# Patient Record
Sex: MALE | RURAL
[De-identification: names, ages, dates, MRNs, and addresses within clinical notes are randomized per-mention and may not be internally consistent; named-entity substitution may affect disease eponyms.]

---

## 2018-08-31 ENCOUNTER — APPOINTMENT (RX ONLY)
Dept: RURAL CLINIC 1 | Facility: CLINIC | Age: 28
Setting detail: DERMATOLOGY
End: 2018-08-31

## 2018-08-31 VITALS — HEIGHT: 72 IN | WEIGHT: 220 LBS

## 2018-08-31 DIAGNOSIS — L71.8 OTHER ROSACEA: ICD-10-CM

## 2018-08-31 PROCEDURE — 99202 OFFICE O/P NEW SF 15 MIN: CPT

## 2018-08-31 PROCEDURE — ? COUNSELING

## 2018-08-31 PROCEDURE — ? PRESCRIPTION

## 2018-08-31 RX ORDER — MINOCYCLINE HYDROCHLORIDE 50 MG/1
TABLET ORAL
Qty: 60 | Refills: 3 | COMMUNITY
Start: 2018-08-31

## 2018-08-31 RX ADMIN — MINOCYCLINE HYDROCHLORIDE: 50 TABLET ORAL at 11:48

## 2018-08-31 ASSESSMENT — LOCATION DETAILED DESCRIPTION DERM: LOCATION DETAILED: RIGHT MEDIAL MALAR CHEEK

## 2018-08-31 ASSESSMENT — LOCATION ZONE DERM: LOCATION ZONE: FACE

## 2018-08-31 ASSESSMENT — LOCATION SIMPLE DESCRIPTION DERM: LOCATION SIMPLE: RIGHT CHEEK

## 2018-10-12 ENCOUNTER — APPOINTMENT (RX ONLY)
Dept: RURAL CLINIC 1 | Facility: CLINIC | Age: 28
Setting detail: DERMATOLOGY
End: 2018-10-12

## 2018-10-12 DIAGNOSIS — L71.8 OTHER ROSACEA: ICD-10-CM | Status: WELL CONTROLLED

## 2018-10-12 PROCEDURE — ? COUNSELING

## 2018-10-12 PROCEDURE — 99213 OFFICE O/P EST LOW 20 MIN: CPT

## 2018-10-12 PROCEDURE — ? TREATMENT REGIMEN

## 2018-10-12 ASSESSMENT — LOCATION SIMPLE DESCRIPTION DERM: LOCATION SIMPLE: RIGHT CHEEK

## 2018-10-12 ASSESSMENT — LOCATION DETAILED DESCRIPTION DERM: LOCATION DETAILED: RIGHT MEDIAL MALAR CHEEK

## 2018-10-12 ASSESSMENT — LOCATION ZONE DERM: LOCATION ZONE: FACE

## 2018-10-12 NOTE — PROCEDURE: TREATMENT REGIMEN
Continue Regimen: Minocycline 50 mg 1 po qd and bid upon flare
Detail Level: Detailed
Plan: Ok to contact office if any refills needed

## 2019-10-17 ENCOUNTER — RX ONLY (OUTPATIENT)
Age: 29
Setting detail: RX ONLY
End: 2019-10-17

## 2019-10-17 RX ORDER — MINOCYCLINE HYDROCHLORIDE 50 MG/1
CAPSULE ORAL
Qty: 60 | Refills: 1 | Status: ERX | COMMUNITY
Start: 2019-10-17